# Patient Record
Sex: FEMALE | Race: WHITE | NOT HISPANIC OR LATINO
[De-identification: names, ages, dates, MRNs, and addresses within clinical notes are randomized per-mention and may not be internally consistent; named-entity substitution may affect disease eponyms.]

---

## 2018-04-09 ENCOUNTER — CHART COPY (OUTPATIENT)
Age: 48
End: 2018-04-09

## 2018-04-26 ENCOUNTER — CHART COPY (OUTPATIENT)
Age: 48
End: 2018-04-26

## 2018-05-16 ENCOUNTER — CHART COPY (OUTPATIENT)
Age: 48
End: 2018-05-16

## 2020-01-21 ENCOUNTER — APPOINTMENT (OUTPATIENT)
Dept: PLASTIC SURGERY | Facility: CLINIC | Age: 50
End: 2020-01-21

## 2020-09-29 ENCOUNTER — APPOINTMENT (OUTPATIENT)
Dept: PLASTIC SURGERY | Facility: CLINIC | Age: 50
End: 2020-09-29

## 2020-12-08 ENCOUNTER — APPOINTMENT (OUTPATIENT)
Dept: PLASTIC SURGERY | Facility: CLINIC | Age: 50
End: 2020-12-08
Payer: SELF-PAY

## 2020-12-08 VITALS — WEIGHT: 118 LBS | OXYGEN SATURATION: 98 % | HEART RATE: 70 BPM | HEIGHT: 60 IN | BODY MASS INDEX: 23.16 KG/M2

## 2020-12-08 DIAGNOSIS — N62 HYPERTROPHY OF BREAST: ICD-10-CM

## 2020-12-08 DIAGNOSIS — M54.2 CERVICALGIA: ICD-10-CM

## 2020-12-08 DIAGNOSIS — M25.519 PAIN IN UNSPECIFIED SHOULDER: ICD-10-CM

## 2020-12-08 DIAGNOSIS — Z41.1 ENCOUNTER FOR COSMETIC SURGERY: ICD-10-CM

## 2020-12-08 PROCEDURE — 99499 UNLISTED E&M SERVICE: CPT

## 2020-12-08 NOTE — HISTORY OF PRESENT ILLNESS
[FreeTextEntry1] : 51 y/o F presents for initial consultation regarding mastopexy and liposuction. She c/o her breasts are "saggy and have no volume". She does c/o occasional neck and back pain as well as changes to her posture where she has to hunch forward. She currently wears 34DD size bra. She is also bothered by excess fat.\par \par She is a previous patient of Dr. 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 She had a lumpectomy in 2012. She has a paternal aunt dx with breast cancer in her 50s.\par \par Last mammo / [facility] was in april 2020. Last MRI - 2-3 years ago.

## 2020-12-08 NOTE — ASSESSMENT
[FreeTextEntry1] : I reviewed with the patient in detail the risks, benefits, and alternatives of bilateral reduction mammoplasty. I explained to her that the goals of the operation are to reduce the size of the breast mounds and to tighten the skin envelope and raise the nipple areola complex. I explained the scar burden associated with this operation and I showed her pictures. I explained the risk of bleeding, infection, delayed wound healing, residual asymmetry, decreased sensation to the nipple areola complex, loss of the nipple areola complex, suboptimal scarring and need for revision surgery. The surgery is approximately 3.5 hours and the recovery is approximately 2 weeks - avoiding any strenuous physical acitivity with 1-2 weeks out of work.\par  \par I reviewed with the patient the risks/benefits risk of mini abdominoplasty vs suction lipectomy alone.  The risks include: bleeding, infection, delayed healing, loss of nipple areolar complex, need for revision surgery, as well as abdominal contour irregularity, numbness.\par \par

## 2020-12-08 NOTE — PHYSICAL EXAM
[Bra Size: _______] : Bra Size: [unfilled] [de-identified] : b/l pendulous breasts, macromastia, no scar, no masses, no nipple discharge. SN - N: L 25cm  R 26cm, BD: L 15cm R 15 cm, grade II/III ptosis L>R, right more ptotic\par \par  [de-identified] : +mild skin laxity, lipodystrophy abdomen and hips

## 2022-03-11 ENCOUNTER — OUTPATIENT (OUTPATIENT)
Dept: OUTPATIENT SERVICES | Facility: HOSPITAL | Age: 52
LOS: 1 days | Discharge: HOME | End: 2022-03-11
Payer: COMMERCIAL

## 2022-03-11 ENCOUNTER — RESULT REVIEW (OUTPATIENT)
Age: 52
End: 2022-03-11

## 2022-03-11 ENCOUNTER — TRANSCRIPTION ENCOUNTER (OUTPATIENT)
Age: 52
End: 2022-03-11

## 2022-03-11 PROCEDURE — 88305 TISSUE EXAM BY PATHOLOGIST: CPT

## 2022-03-11 PROCEDURE — 45380 COLONOSCOPY AND BIOPSY: CPT | Mod: PT,XS

## 2022-03-11 PROCEDURE — 88305 TISSUE EXAM BY PATHOLOGIST: CPT | Mod: 26

## 2022-03-11 PROCEDURE — 45385 COLONOSCOPY W/LESION REMOVAL: CPT | Mod: PT

## 2022-03-14 LAB — SURGICAL PATHOLOGY STUDY: SIGNIFICANT CHANGE UP

## (undated) DEVICE — SNARE CAPTIVATOR RND COLD STIFF 10X2.8MM

## (undated) DEVICE — FORCEP RADIAL JAW 4 W NDL 2.2MM 2.8MM 240CM ORANGE DISP